# Patient Record
Sex: MALE | Race: WHITE | NOT HISPANIC OR LATINO | URBAN - METROPOLITAN AREA
[De-identification: names, ages, dates, MRNs, and addresses within clinical notes are randomized per-mention and may not be internally consistent; named-entity substitution may affect disease eponyms.]

---

## 2021-01-15 ENCOUNTER — EMERGENCY (EMERGENCY)
Facility: HOSPITAL | Age: 36
LOS: 1 days | Discharge: HOME | End: 2021-01-15
Attending: EMERGENCY MEDICINE | Admitting: EMERGENCY MEDICINE
Payer: COMMERCIAL

## 2021-01-15 VITALS
TEMPERATURE: 99 F | DIASTOLIC BLOOD PRESSURE: 64 MMHG | RESPIRATION RATE: 16 BRPM | HEIGHT: 68 IN | WEIGHT: 164.91 LBS | OXYGEN SATURATION: 98 % | HEART RATE: 61 BPM | SYSTOLIC BLOOD PRESSURE: 122 MMHG

## 2021-01-15 DIAGNOSIS — Z20.822 CONTACT WITH AND (SUSPECTED) EXPOSURE TO COVID-19: ICD-10-CM

## 2021-01-15 PROCEDURE — 99283 EMERGENCY DEPT VISIT LOW MDM: CPT

## 2021-01-15 NOTE — ED PROVIDER NOTE - HIV OFFER
Take antibiotic until gone.    Our goal in the emergency department is to always give you outstanding care and exceptional service. You may receive a survey by mail or e-mail in the next week regarding your experience in our ED. We would greatly appreciate your completing and returning the survey. Your feedback provides us with a way to recognize our staff who give very good care and it helps us learn how to improve when your experience was below our aspiration of excellence.    
Opt out

## 2021-01-15 NOTE — ED PROVIDER NOTE - PATIENT PORTAL LINK FT
You can access the FollowMyHealth Patient Portal offered by [facility] by registering at the following website: [email or web address]. By joining [employer]’s FollowMyHealth portal, you will also be able to view your health information using other applications (apps) compatible with our system.

## 2021-01-15 NOTE — ED PROVIDER NOTE - HISTORY ATTESTATION, MLM
I have reviewed and confirmed nurses' notes...
Spine appears normal, range of motion is not limited, no muscle or joint tenderness

## 2021-02-17 ENCOUNTER — TRANSCRIPTION ENCOUNTER (OUTPATIENT)
Age: 36
End: 2021-02-17

## 2021-05-22 ENCOUNTER — TRANSCRIPTION ENCOUNTER (OUTPATIENT)
Age: 36
End: 2021-05-22

## 2022-02-06 ENCOUNTER — EMERGENCY (EMERGENCY)
Facility: HOSPITAL | Age: 37
LOS: 1 days | Discharge: HOME | End: 2022-02-06
Attending: EMERGENCY MEDICINE | Admitting: EMERGENCY MEDICINE
Payer: COMMERCIAL

## 2022-02-06 VITALS
RESPIRATION RATE: 18 BRPM | TEMPERATURE: 98 F | HEIGHT: 68 IN | SYSTOLIC BLOOD PRESSURE: 143 MMHG | HEART RATE: 62 BPM | OXYGEN SATURATION: 98 % | DIASTOLIC BLOOD PRESSURE: 83 MMHG
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
## 2022-02-06 DIAGNOSIS — S61.304A UNSPECIFIED OPEN WOUND OF RIGHT RING FINGER WITH DAMAGE TO NAIL, INITIAL ENCOUNTER: ICD-10-CM
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
## 2022-02-06 PROBLEM — Z78.9 OTHER SPECIFIED HEALTH STATUS: Chronic | Status: ACTIVE | Noted: 2021-01-15

## 2022-02-06 PROCEDURE — 99283 EMERGENCY DEPT VISIT LOW MDM: CPT

## 2022-02-06 NOTE — ED PROVIDER NOTE - PATIENT PORTAL LINK FT
You can access the FollowMyHealth Patient Portal offered by [facility] by registering at the following website: [email or web address]. By joining [employer]’s FollowMyHealth portal, you will also be able to view your health information using other applications (apps) compatible with our system.

## 2022-02-06 NOTE — ED PROVIDER NOTE - NEUROLOGICAL, MLM
Alert and oriented, no focal deficits, no motor or sensory deficits. speech fluent and appropriate cooperative

## 2022-02-06 NOTE — ED ADULT NURSE NOTE - OBJECTIVE STATEMENT
c/o right 3rd 4th finger avulsion from kitchen mandolin, bleeding at site, bandaged while pending MD gómez

## 2022-02-06 NOTE — ED PROVIDER NOTE - NSFOLLOWUPINSTRUCTIONS_ED_ALL_ED_FT
keep wound clean and dry     wound dressing to remain in place for 3-5 days     return to ER if wound become painful red swollen or any other concern

## 2022-02-06 NOTE — ED PROVIDER NOTE - MUSCULOSKELETAL, MLM
0.5 x 0.5 avulsion injury to rught 3/4th finger tips blood oozing from wounds FROM NVI sensory motor normal

## 2022-02-06 NOTE — ED PROVIDER NOTE - CLINICAL SUMMARY MEDICAL DECISION MAKING FREE TEXT BOX
ED evaluation and management discussed with the patient and family (if available) in detail.  Close PMD follow up encouraged.  Strict ED return instructions discussed in detail and patient given the opportunity to ask any questions about their discharge diagnosis and instructions. Patient verbalized understanding. ED evaluation and management discussed with the patient and family (if available) in detail.  Close PMD follow up encouraged.  Strict ED return instructions discussed in detail and patient given the opportunity to ask any questions about their discharge diagnosis and instructions. Patient verbalized understanding.    Surgicel wound dressing placed with hemostasis - no complications .  bandage place on each digit prior to dc
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
## 2022-10-26 ENCOUNTER — EMERGENCY (EMERGENCY)
Facility: HOSPITAL | Age: 37
LOS: 1 days | Discharge: HOME | End: 2022-10-26
Admitting: EMERGENCY MEDICINE

## 2022-10-26 VITALS
TEMPERATURE: 98 F | WEIGHT: 156.09 LBS | HEART RATE: 72 BPM | RESPIRATION RATE: 18 BRPM | SYSTOLIC BLOOD PRESSURE: 115 MMHG | OXYGEN SATURATION: 99 % | DIASTOLIC BLOOD PRESSURE: 64 MMHG | HEIGHT: 68 IN

## 2022-10-26 PROCEDURE — 99283 EMERGENCY DEPT VISIT LOW MDM: CPT | Mod: 25

## 2022-10-26 PROCEDURE — 12001 RPR S/N/AX/GEN/TRNK 2.5CM/<: CPT

## 2022-10-26 NOTE — ED ADULT NURSE NOTE - NSIMPLEMENTINTERV_GEN_ALL_ED
Implemented All Universal Safety Interventions:  [place] to call system. Call bell, personal items and telephone within reach. Instruct patient to call for assistance. Room bathroom lighting operational. Non-slip footwear when patient is off stretcher. Physically safe environment: no spills, clutter or unnecessary equipment. Stretcher in lowest position, wheels locked, appropriate side rails in place.

## 2022-10-26 NOTE — ED PROVIDER NOTE - PATIENT PORTAL LINK FT
You can access the FollowMyHealth Patient Portal offered by [facility] by registering at the following website: [email or web address]. By joining [employer]’s FollowMyHealth portal, you will also be able to view your health information using other applications (apps) compatible with our system.
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
## 2022-10-26 NOTE — ED PROVIDER NOTE - NEUROLOGICAL, MLM
Patient is alert and oriented. Cranial nerves 2-12 within normal limits. Normal cerebellar testing. Normal gait. No focal deficits.

## 2022-10-26 NOTE — ED PROVIDER NOTE - NSFOLLOWUPINSTRUCTIONS_ED_ALL_ED_FT
YOUR WOUND WAS CLOSED WITH STAPLES.  RETURN TO THE ER OR URGENT CARE IN 7-10 DAYS TO HAVE THEM REMOVED    WHAT YOU NEED TO KNOW:    Staples are often used to close a wound. Your staples may be placed for 3 to 14 days, depending on the location of your wound.    DISCHARGE INSTRUCTIONS:    Care for your wound:     Clean:   You may be able to shower in 24 hours. Do not soak your wound under water.    Gently wash your wound with soap and warm water daily. Lightly pat it dry. Do not cover your wound.    Do not apply ointment or cream to the wound.    Return to the ER if:     You have redness, pain, swelling, and pus draining from your wound.    Your pain medicine does not relieve your pain.    You have a fever of 101°F (38.5°C) or higher.    You have an odor coming from your wound.    You have questions or concerns about your condition or care.    Return to the emergency department if:     Your wound reopens.    You have red streaks in your skin that spread out from your wound.    You have severe pain or vomiting.
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
## 2022-10-26 NOTE — ED ADULT NURSE NOTE - OBJECTIVE STATEMENT
Pt with small lac to top of head, bleeding controlled. LAst tetanus was 2015. No LOC, no ACs, no n/v, no blurred vision, no dizziness. Pt AOx4.

## 2022-10-26 NOTE — ED PROVIDER NOTE - ATTESTATION, MLM
Normal for race
I have reviewed and confirmed nurses' notes for patient's medications, allergies, medical history, and surgical history.

## 2022-10-26 NOTE — ED ADULT NURSE NOTE - IN THE PAST 12 MONTHS HAVE YOU USED DRUGS OTHER THAN THOSE REQUIRED FOR MEDICAL REASON?
[facility] patient reported not being able to walk with a walker per instructions from Dr Gary.    Nellie is calling to verify and get further instruction on physical therapy treatment.    Please call Nellie back regarding.     No
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
## 2022-10-28 DIAGNOSIS — S09.90XA UNSPECIFIED INJURY OF HEAD, INITIAL ENCOUNTER: ICD-10-CM
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
## 2022-10-28 DIAGNOSIS — S01.01XA LACERATION WITHOUT FOREIGN BODY OF SCALP, INITIAL ENCOUNTER: ICD-10-CM
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
## 2023-09-06 ENCOUNTER — EMERGENCY (EMERGENCY)
Facility: HOSPITAL | Age: 38
LOS: 1 days | Discharge: HOME | End: 2023-09-06
Attending: EMERGENCY MEDICINE | Admitting: EMERGENCY MEDICINE
Payer: COMMERCIAL

## 2023-09-06 VITALS
WEIGHT: 154.98 LBS | SYSTOLIC BLOOD PRESSURE: 126 MMHG | HEIGHT: 69 IN | DIASTOLIC BLOOD PRESSURE: 77 MMHG | RESPIRATION RATE: 18 BRPM | HEART RATE: 63 BPM | OXYGEN SATURATION: 95 % | TEMPERATURE: 98 F

## 2023-09-06 PROCEDURE — 99283 EMERGENCY DEPT VISIT LOW MDM: CPT

## 2023-09-06 NOTE — ED PROVIDER NOTE - PHYSICAL EXAMINATION
Constitutional: awake and alert, in no acute distress  HEENT: head normocephalic.  1cm linear abrasion to L frontal scalp, not gaping, oozing blood.  moist mucous membranes  Eyes: extraocular movements intact, normal conjunctiva  Neck: supple, normal ROM  Cardiovascular: regular rate   Pulmonary: no respiratory distress  Gastrointestinal: abdomen flat and nondistended  Skin: warm, dry, normal for ethnicity  Musculoskeletal: no edema, no deformity  Neurological: oriented x4, no focal neurologic deficit.   Psychiatric: calm and cooperative
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
## 2023-09-06 NOTE — ED PROVIDER NOTE - PATIENT PORTAL LINK FT
You can access the FollowMyHealth Patient Portal offered by [facility] by registering at the following website: [email or web address]. By joining [employer]’s FollowMyHealth portal, you will also be able to view your health information using other applications (apps) compatible with our system.

## 2023-09-06 NOTE — ED PROVIDER NOTE - IV ALTEPLASE INCLUSION HIDDEN
Echocardiogram was performed to evaluate presence of clot in the left atrial appendage. No clot present, will proceed. show
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
## 2023-09-06 NOTE — ED PROVIDER NOTE - NSFOLLOWUPINSTRUCTIONS_ED_ALL_ED_FT
Abrasion  An abrasion is a cut or a scrape on your skin. You must take care of your wound so germs do not get in it and cause infection.    What are the causes?  This condition is caused by rubbing your skin on something or falling on a surface, such as the ground. When your skin rubs on something, some layers of skin may rub off.    What are the signs or symptoms?  A cut or a scrape.  Bleeding.  A red or pink spot.  A bruise under your wound.  How is this treated?  This condition may be treated with:  Cleaning your wound.  Putting ointment on your wound.  Putting a bandage on your wound.  Getting a tetanus shot.  Follow these instructions at home:  Your doctor may tell you to do these things:    Medicines    Take or use over-the-counter and prescription medicines only as told by your doctor.  If you were prescribed an antibiotic medicine, use it as told by your doctor. Do not stop using it even if you start to feel better.  Keep your wound clean    Clean your wound 1 or 2 times a day or as often told by your doctor. To do this:  Wash your hands for at least 20 seconds with mild soap and water. Do this before and after you clean your wound.  Wash your wound with mild soap and water.  Rinse off the soap.  Pat your wound with a clean towel to dry it. Do not rub your wound.  Keep your bandage clean and dry. Take it off and change it as told by your doctor.  You may have to change your bandage one or more times a day, or as told by your doctor.  Watch for signs of infection    Check your wound every day for signs of infection. Check for:  A red streak that goes away from your wound.  Other redness.  Swelling or more pain.  Warmth.  Blood, fluid, pus, or a bad smell.  Treat pain and swelling      If told, put ice on the injured area. To do this:  Put ice in a plastic bag.  Place a towel between your skin and the bag.  Leave the ice on for 20 minutes, 2–3 times a day.  Take off the ice if your skin turns bright red. This is very important. If you cannot feel pain, heat, or cold, you have a greater risk of damage to the area.  If you can, raise the injured area above the level of your heart while you are sitting or lying down.  General instructions    Do not take baths, swim, or use a hot tub. Ask your doctor about taking showers or sponge baths.  Keep all follow-up visits.  Contact a doctor if:  You had a tetanus shot, and you have any of these where the needle went in:  Swelling.  Very bad pain.  Redness.  Bleeding.  You have a lot of pain, and medicine does not help.  You have a fever.  You have any of these signs of infection in your wound:  Redness, swelling, or more pain.  Blood, fluid, pus, or a bad smell.  Warmth.  Get help right away if:  You have a red streak going away from your wound.  Summary  An abrasion is a cut or a scrape on your skin. Take care of your wound so germs do not get in it.  Clean your wound 1 or 2 times a day or as often as told. Change your bandage as told and use medicines as told.  Call your doctor if you have a fever or if you have redness, swelling, or more pain in your wound.  Call your doctor if you have blood, fluid, pus, or a bad smell in your wound.  Get help right away if you have a red streak going away from your wound.  This information is not intended to replace advice given to you by your health care provider. Make sure you discuss any questions you have with your health care provider.

## 2023-09-06 NOTE — ED ADULT NURSE NOTE - NSFALLUNIVINTERV_ED_ALL_ED
Bed/Stretcher in lowest position, wheels locked, appropriate side rails in place/Call bell, personal items and telephone in reach/Instruct patient to call for assistance before getting out of bed/chair/stretcher/Non-slip footwear applied when patient is off stretcher/[place] to call system/Physically safe environment - no spills, clutter or unnecessary equipment/Purposeful proactive rounding/Room/bathroom lighting operational, light cord in reach
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
## 2023-09-09 DIAGNOSIS — S00.01XA ABRASION OF SCALP, INITIAL ENCOUNTER: ICD-10-CM

## 2023-09-09 DIAGNOSIS — S09.90XA UNSPECIFIED INJURY OF HEAD, INITIAL ENCOUNTER: ICD-10-CM
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
## 2024-06-30 NOTE — ED ADULT TRIAGE NOTE - BSA (M2)
"  Emergency Department Note      History of Present Illness     Chief Complaint   Abdominal Pain      HPI   Castillo Bullard is a 32 year old male who presents to the emergency department for evaluation of abdominal pain. The patient reports both upper and lower abdominal pain that suddenly started on Wednesday and has progressively gotten worse. He states that it hurts to stand up and he has never had pain like this before. He reports that he had a cardiac surgery 15 years ago but has not had any abdominal surgeries in the past. Additionally, he states he has been eating normally and eating does not exacerbate the pain. He denies any nausea, vomiting, urinary symptoms, changes in bowel movements, fever, chest pain, or shortness of breath. He denies a family history of abdominal issues. He denies any current medications or medical conditions.    Independent Historian   None    Review of External Notes   No recent clinic visits available for review    Past Medical History     Medical History and Problem List   Gonorrhea  Draining postoperative wound  [place]/Fibula fracture  HSV-2 infection    Medications   Ocuflox  Roxicodone    Surgical History   Cardiac surgery    Physical Exam     Patient Vitals for the past 24 hrs:   BP Temp Temp src Pulse Resp SpO2 Height Weight   06/30/24 0131 -- -- -- -- -- 99 % -- --   06/29/24 2216 (!) 144/102 -- -- -- -- -- -- --   06/29/24 2215 -- 98.9  F (37.2  C) Oral 70 18 97 % 1.626 m (5' 4\") 82.7 kg (182 lb 5.1 oz)     Physical Exam  General: Adult sitting upright  Eyes: PERRL, Conjunctive within normal limits. No scleral icterus.  ENT: Moist mucous membranes, oropharynx clear.   CV: Normal S1S2, no murmur, rub or gallop. Regular rate and rhythm  Resp: Clear to auscultation bilaterally, no wheezes, rales or rhonchi. Normal respiratory effort.  GI: Abdomen is soft and nondistended. Diffuse lower abdominal and right sided tenderness to palpation, most prominent in the RLQ and " epigastrium. Voluntary guarding, no rebound.  No palpable masses.   MSK: No edema. Nontender. Normal active range of motion.  Skin: Warm and dry. No rashes or lesions or ecchymoses on visible skin.  Neuro: Alert and oriented. Responds appropriately to all questions and commands. No focal findings appreciated. Normal muscle tone.  Psych: Normal mood and affect. Pleasant.     Diagnostics     Lab Results   Labs Ordered and Resulted from Time of ED Arrival to Time of ED Departure   COMPREHENSIVE METABOLIC PANEL - Abnormal       Result Value    Sodium 143      Potassium 4.1      Carbon Dioxide (CO2) 23      Anion Gap 13      Urea Nitrogen 13.5      Creatinine 1.00      GFR Estimate >90      Calcium 9.2      Chloride 107      Glucose 119 (*)     Alkaline Phosphatase 86       (*)      (*)     Protein Total 7.3      Albumin 4.4      Bilirubin Total <0.2     ROUTINE UA WITH MICROSCOPIC REFLEX TO CULTURE - Abnormal    Color Urine Light Yellow      Appearance Urine Clear      Glucose Urine Negative      Bilirubin Urine Negative      Ketones Urine Negative      Specific Gravity Urine 1.010      Blood Urine Negative      pH Urine 7.5 (*)     Protein Albumin Urine 200 (*)     Urobilinogen Urine Normal      Nitrite Urine Negative      Leukocyte Esterase Urine Moderate (*)     Mucus Urine Present (*)     RBC Urine <1      WBC Urine 2      Squamous Epithelials Urine 1     LIPASE - Normal    Lipase 40     CBC WITH PLATELETS AND DIFFERENTIAL    WBC Count 9.8      RBC Count 5.12      Hemoglobin 15.1      Hematocrit 45.5      MCV 89      MCH 29.5      MCHC 33.2      RDW 12.9      Platelet Count 231      % Neutrophils 65      % Lymphocytes 27      % Monocytes 6      % Eosinophils 2      % Basophils 0      % Immature Granulocytes 0      NRBCs per 100 WBC 0      Absolute Neutrophils 6.4      Absolute Lymphocytes 2.6      Absolute Monocytes 0.6      Absolute Eosinophils 0.2      Absolute Basophils 0.0      Absolute Immature  1.85 Granulocytes 0.0      Absolute NRBCs 0.0     URINE CULTURE       Imaging   US Abdomen Limited   Final Result   IMPRESSION:   1.  The gallbladder is not well distended. No stones, biliary dilatation or acute inflammatory signs.      2.  Echogenic hepatic parenchyma without discrete lesion.      3.  Pancreas not well seen due to overlying bowel gas.      US LI-RADS Category for high risk liver surveillance: US-1 Negative or definitely benign observation.      American College of Rad[employer] Committee on LI-RADS v2017.       CT Abdomen Pelvis w Contrast   Final Result   IMPRESSION:    1.  No acute inflammatory changes in the abdomen and pelvis. Normal appendix. Formed stool material within normal caliber colon without mechanical obstruction, free gas or free fluid.      2.  Diffuse fatty infiltration of the liver without discrete lesion. Tiny fat-containing ventral umbilical hernia.      3.  Postoperative changes of ASD closure.          EKG   None    Independent Interpretation   None    ED Course      Medications Administered   Medications   sodium chloride (PF) 0.9% PF flush 100 mL (63 mLs Intravenous $Given 6/30/24 0126)   iopamidol (ISOVUE-370) solution 500 mL (92 mLs Intravenous $Given 6/30/24 0125)       Procedures   Procedures     Discussion of Management   None    Social Determinants of Health adding to complexity of care   None    ED Course   ED Course as of 06/30/24 0205   [place] Jun 30, 2024   0058 I initially assessed the patient and obtained the above history and physical exam.  He declines pain medications     I reassessed the patient.  He was sleeping and easily awakened.  He appears comfortable.  I discussed all results.  All questions answered prior to discharge.    Medical Decision Making / Diagnosis       JASSI   Castillo Bullard is a 32 year old male who presents emergency room for abdominal pain that localizes to the upper and lower abdomen predominantly right-sided.  Multiple etiologies were  considered including cholecystitis, biliary colic, pancreatitis, GERD/PUD, appendicitis, renal colic, UTI, etc.  Ultimately, consistent with mild elevation of LFTs.  He has hepatic steatosis noted on CT which could contribute to his symptoms.  I also considered PUD or GERD.  Acid reduction medications recommended.  No evidence of pancreatitis, biliary colic, cholecystitis.  No evidence of bowel obstruction or other acute cause for symptoms found on CT or ultrasound.  Ultimately he seems reasonable for discharge, with follow-up closely with the primary care provider within 2 to 3 days.  Return precautions discussed including fever, uncontrolled pain, tractable vomiting or vomiting of blood or black material, black or bloody stool.  All questions answered prior to discharge.    Disposition   The patient was discharged.     Diagnosis     ICD-10-CM    1. Abdominal pain of unknown cause  R10.9       2. Hepatic steatosis  K76.0       3. Elevated LFTs  R79.89            Discharge Medications   New Prescriptions    FAMOTIDINE (PEPCID) 20 MG TABLET    Take 1 tablet (20 mg) by mouth 2 times daily for 30 days    ONDANSETRON (ZOFRAN ODT) 4 MG ODT TAB    Take 1 tablet (4 mg) by mouth every 8 hours as needed for nausea or vomiting         Scribe Disclosure:  Riky SOLORZANO, am serving as a scribe at 12:49 AM on 6/30/2024 to document services personally performed by Soledad Law MD based on my observations and the provider's statements to me.        Soledad Law MD  06/30/24 030